# Patient Record
Sex: FEMALE | Race: WHITE | Employment: FULL TIME | URBAN - METROPOLITAN AREA
[De-identification: names, ages, dates, MRNs, and addresses within clinical notes are randomized per-mention and may not be internally consistent; named-entity substitution may affect disease eponyms.]

---

## 2018-05-07 ENCOUNTER — RESULT FOLLOW UP (OUTPATIENT)
Dept: OBGYN | Facility: CLINIC | Age: 28
End: 2018-05-07

## 2018-05-07 ENCOUNTER — OFFICE VISIT (OUTPATIENT)
Dept: OBGYN | Facility: CLINIC | Age: 28
End: 2018-05-07
Payer: COMMERCIAL

## 2018-05-07 VITALS
BODY MASS INDEX: 30.57 KG/M2 | HEART RATE: 87 BPM | DIASTOLIC BLOOD PRESSURE: 79 MMHG | OXYGEN SATURATION: 98 % | TEMPERATURE: 98.3 F | SYSTOLIC BLOOD PRESSURE: 127 MMHG | HEIGHT: 71 IN | WEIGHT: 218.4 LBS

## 2018-05-07 DIAGNOSIS — R87.612 PAPANICOLAOU SMEAR OF CERVIX WITH LOW GRADE SQUAMOUS INTRAEPITHELIAL LESION (LGSIL): ICD-10-CM

## 2018-05-07 DIAGNOSIS — B37.31 YEAST VAGINITIS: ICD-10-CM

## 2018-05-07 DIAGNOSIS — Z30.015 ENCOUNTER FOR INITIAL PRESCRIPTION OF VAGINAL RING HORMONAL CONTRACEPTIVE: ICD-10-CM

## 2018-05-07 DIAGNOSIS — Z12.4 SCREENING FOR MALIGNANT NEOPLASM OF CERVIX: ICD-10-CM

## 2018-05-07 DIAGNOSIS — N89.8 VAGINAL DISCHARGE: ICD-10-CM

## 2018-05-07 DIAGNOSIS — Z11.3 SCREEN FOR STD (SEXUALLY TRANSMITTED DISEASE): Primary | ICD-10-CM

## 2018-05-07 LAB
SPECIMEN SOURCE: ABNORMAL
WET PREP SPEC: ABNORMAL

## 2018-05-07 PROCEDURE — G0145 SCR C/V CYTO,THINLAYER,RESCR: HCPCS | Performed by: NURSE PRACTITIONER

## 2018-05-07 PROCEDURE — 87491 CHLMYD TRACH DNA AMP PROBE: CPT | Performed by: NURSE PRACTITIONER

## 2018-05-07 PROCEDURE — 86803 HEPATITIS C AB TEST: CPT | Performed by: NURSE PRACTITIONER

## 2018-05-07 PROCEDURE — 87389 HIV-1 AG W/HIV-1&-2 AB AG IA: CPT | Performed by: NURSE PRACTITIONER

## 2018-05-07 PROCEDURE — 87210 SMEAR WET MOUNT SALINE/INK: CPT | Performed by: NURSE PRACTITIONER

## 2018-05-07 PROCEDURE — 87591 N.GONORRHOEAE DNA AMP PROB: CPT | Performed by: NURSE PRACTITIONER

## 2018-05-07 PROCEDURE — 86780 TREPONEMA PALLIDUM: CPT | Performed by: NURSE PRACTITIONER

## 2018-05-07 PROCEDURE — 36415 COLL VENOUS BLD VENIPUNCTURE: CPT | Performed by: NURSE PRACTITIONER

## 2018-05-07 PROCEDURE — 99203 OFFICE O/P NEW LOW 30 MIN: CPT | Performed by: NURSE PRACTITIONER

## 2018-05-07 PROCEDURE — 87340 HEPATITIS B SURFACE AG IA: CPT | Performed by: NURSE PRACTITIONER

## 2018-05-07 PROCEDURE — G0124 SCREEN C/V THIN LAYER BY MD: HCPCS | Performed by: NURSE PRACTITIONER

## 2018-05-07 RX ORDER — ETONOGESTREL AND ETHINYL ESTRADIOL VAGINAL RING .015; .12 MG/D; MG/D
RING VAGINAL
Qty: 3 EACH | Refills: 3 | Status: SHIPPED | OUTPATIENT
Start: 2018-05-07 | End: 2018-11-23

## 2018-05-07 RX ORDER — FLUCONAZOLE 150 MG/1
150 TABLET ORAL ONCE
Qty: 1 TABLET | Refills: 0 | Status: SHIPPED | OUTPATIENT
Start: 2018-05-07 | End: 2018-05-07

## 2018-05-07 ASSESSMENT — PAIN SCALES - GENERAL: PAINLEVEL: NO PAIN (0)

## 2018-05-07 NOTE — PROGRESS NOTES
"  SUBJECTIVE:   Enrique Worthy is a 27 year old female who presents to clinic today for the following health issues:    Vaginal Symptoms  Onset: 05/06/2018    Description:  Vaginal Discharge: white curd-like   Itching (Pruritis): YES  Burning sensation:  YES  Odor: no     Accompanying Signs & Symptoms:  Pain with Urination: no   Abdominal Pain: no   Fever: no     History:   Sexually active: YES  New Partner: YES  Possibility of Pregnancy:  Yes    Precipitating factors:   Recent Antibiotic Use: no     Alleviating factors:  na    Therapies Tried and outcome: none    Patient has a new partner and would like STI screening. Discharge began yesterday, white and thick, accompanied by vulvar itching and discomfort. No abnormal odor, urinary symptoms. Denies nausea, fevers, use of new products.   Overdue for pap smear and amenable to completing today.  Would also like to discuss contraception-currently using \"withdrawal\" method.    Problem list and histories reviewed & adjusted, as indicated.  Additional history: as documented    Patient Active Problem List   Diagnosis     Posttraumatic stress disorder     Obsessive-compulsive disorder     CARDIOVASCULAR SCREENING; LDL GOAL LESS THAN 160     Past Surgical History:   Procedure Laterality Date     NO HISTORY OF SURGERY         Social History   Substance Use Topics     Smoking status: Never Smoker     Smokeless tobacco: Never Used     Alcohol use No     History reviewed. No pertinent family history.        Reviewed and updated as needed this visit by clinical staff       Reviewed and updated as needed this visit by Provider         ROS:  Constitutional, HEENT, cardiovascular, pulmonary, gi and gu systems are negative, except as otherwise noted.    OBJECTIVE:     /79  Pulse 87  Temp 98.3  F (36.8  C) (Oral)  Ht 5' 11\" (1.803 m)  Wt 218 lb 6.4 oz (99.1 kg)  LMP 04/23/2018 (Approximate)  SpO2 98%  BMI 30.46 kg/m2  Body mass index is 30.46 kg/(m^2).  GENERAL: healthy, " alert and no distress  RESP: lungs clear to auscultation - no rales, rhonchi or wheezes  CV: regular rate and rhythm, normal S1 S2, no S3 or S4, no murmur, click or rub, no peripheral edema and peripheral pulses strong  ABDOMEN: soft, nontender, no hepatosplenomegaly, no masses and bowel sounds normal   (female): normal female external genitalia, normal urethral meatus, vaginal mucosa, normal cervix/adnexa/uterus without masses or discharge  MS: no gross musculoskeletal defects noted, no edema  SKIN: no suspicious lesions or rashes  PSYCH: mentation appears normal, affect normal/bright    Diagnostic Test Results:  Results for orders placed or performed in visit on 05/07/18 (from the past 24 hour(s))   Wet prep   Result Value Ref Range    Specimen Description Vagina     Wet Prep Yeast seen (A)     Wet Prep No Trichomonas seen     Wet Prep No clue cells seen        ASSESSMENT/PLAN:   1. Screening for malignant neoplasm of cervix  - Pap imaged thin layer screen reflex to HPV if ASCUS - recommend age 25 - 29    2. Vaginal discharge  - Wet prep    3. Screen for STD (sexually transmitted disease)  Labs obtained and we will notify patient of results when available.  - Neisseria gonorrhoeae PCR  - Chlamydia trachomatis PCR  - HIV Antigen Antibody Combo  - Hepatitis B surface antigen  - Hepatitis C antibody  - Treponema Abs w Reflex to RPR and Titer    4. Yeast vaginitis  Discussed results. Will send prescription for Diflucan. Discussed use, recommend no intercourse x 1 week.  - fluconazole (DIFLUCAN) 150 MG tablet; Take 1 tablet (150 mg) by mouth once for 1 dose  Dispense: 1 tablet; Refill: 0    5. Encounter for initial prescription of vaginal ring hormonal contraceptive  Discussed options for contraception with patient including oral contraceptive pills, transdermal patches, vaginal ring, Depo Provera, Nexplanon. At this time she would like to try Nuva Ring. We discussed when to start, insertion and removal, possible  side effects she may experience, and use of barrier method to protect against STDs.   - etonogestrel-ethinyl estradiol (NUVARING) 0.12-0.015 MG/24HR vaginal ring; Insert 1 ring vaginally and leave in for 21 days then remove for 1 week then repeat with new ring.  Dispense: 3 each; Refill: 3    OPAL Germain CNP  [facility]

## 2018-05-07 NOTE — NURSING NOTE
"Chief Complaint   Patient presents with     Vaginal Problem       Initial /79  Pulse 87  Temp 98.3  F (36.8  C) (Oral)  Ht 5' 11\" (1.803 m)  Wt 218 lb 6.4 oz (99.1 kg)  LMP 04/23/2018 (Approximate)  SpO2 98%  BMI 30.46 kg/m2 Estimated body mass index is 30.46 kg/(m^2) as calculated from the following:    Height as of this encounter: 5' 11\" (1.803 m).    Weight as of this encounter: 218 lb 6.4 oz (99.1 kg)..  BP completed using cuff size: perlita Greene CMA    "

## 2018-05-07 NOTE — LETTER
July 9, 2018      Enrique LUNDBERG Deacon  [address] [place] [address]    Dear ,      At [facility], your health and wellness is our primary concern. That is why we are following up on an abnormal pap from 5/7/18, which was reported as LSIL. Your provider had recommended that you have a Colposcopy completed by 8/7/18. Our records do not show that this has been scheduled.    It is important to complete the follow up that your provider has suggested for you to ensure that there are no worsening changes which may, over time, develop into cancer.      Please contact our office at  [phone] to schedule an appointment for a Colposcopy at your earliest convenience. If you have questions or concerns, please call the clinic and we will be happy to assist you.    If you have completed the tests outside of [facility], please have the results forwarded to our office. We will update the chart for your primary Physician to review before your next annual physical.     Thank you for choosing [facility]!    Sincerely,      OPAL Germain CNP/rlm

## 2018-05-07 NOTE — MR AVS SNAPSHOT
After Visit Summary   5/7/2018    Enrique Worthy    MRN: [id number]           Patient Information     Date Of Birth          1990        Visit Information        Provider Department      5/7/2018 11:50 AM Jennie Ryan APRN CNP [facility]        Today's Diagnoses     Screen for STD (sexually transmitted disease)    -  1    Screening for malignant neoplasm of cervix        Vaginal discharge        Yeast vaginitis        Encounter for initial prescription of vaginal ring hormonal contraceptive           Follow-ups after your visit        Who to contact     If you have questions or need follow up information about today's clinic visit or your schedule please contact [facility] directly at [phone].  Normal or non-critical lab and imaging results will be communicated to you by MyChart, letter or phone within 4 business days after the clinic has received the results. If you do not hear from us within 7 days, please contact the clinic through NIM[employer] or phone. If you have a critical or abnormal lab result, we will notify you by phone as soon as possible.  Submit refill requests through "[employer] or call your pharmacy and they will forward the refill request to us. Please allow 3 business days for your refill to be completed.          Additional Information About Your Visit        MyChart Information     "[employer] gives you secure access to your electronic health record. If you see a primary care provider, you can also send messages to your care team and make appointments. If you have questions, please call your primary care clinic.  If you do not have a primary care provider, please call [phone] and they will assist you.        Care EveryWhere ID     This is your Care EveryWhere ID. This could be used by other organizations to access your [place] medical records  [id number]        Your Vitals Were     Pulse Temperature Height Last Period Pulse Oximetry BMI  "(Body Mass Index)    87 98.3  F (36.8  C) (Oral) 5' 11\" (1.803 m) 04/23/2018 (Approximate) 98% 30.46 kg/m2       Blood Pressure from Last 3 Encounters:   05/07/18 127/79   03/01/15 112/80   05/30/13 112/67    Weight from Last 3 Encounters:   05/07/18 218 lb 6.4 oz (99.1 kg)   03/01/15 180 lb (81.6 kg)   05/30/13 181 lb (82.1 kg)              We Performed the Following     Chlamydia trachomatis PCR     Hepatitis B surface antigen     Hepatitis C antibody     HIV Antigen Antibody Combo     Neisseria gonorrhoeae PCR     Pap imaged thin layer screen reflex to HPV if ASCUS - recommend age 25 - 29     Treponema Abs w Reflex to RPR and Titer     Wet prep          Today's Medication Changes          These changes are accurate as of 5/7/18 12:49 PM.  If you have any questions, ask your nurse or doctor.               Start taking these medicines.        Dose/Directions    etonogestrel-ethinyl estradiol 0.12-0.015 MG/24HR vaginal ring   Commonly known as:  NUVARING   Used for:  Encounter for initial prescription of vaginal ring hormonal contraceptive   Started by:  Jennie Ryan APRN CNP        Insert 1 ring vaginally and leave in for 21 days then remove for 1 week then repeat with new ring.   Quantity:  3 each   Refills:  3       fluconazole 150 MG tablet   Commonly known as:  DIFLUCAN   Used for:  Yeast vaginitis   Started by:  Jennie Ryan APRN CNP        Dose:  150 mg   Take 1 tablet (150 mg) by mouth once for 1 dose   Quantity:  1 tablet   Refills:  0         Stop taking these medicines if you haven't already. Please contact your care team if you have questions.     ALPRAZolam 0.5 MG tablet   Commonly known as:  XANAX   Stopped by:  Jennie Ryan APRN CNP           ibuprofen 800 MG tablet   Commonly known as:  ADVIL/MOTRIN   Stopped by:  Jennie Ryan APRN CNP           IBUPROFEN PO   Stopped by:  Jennie Ryan APRN CNP                Where to get your medicines      These " [facility] [place], [place] [facility] [address] [facility] [address]  [address] [facility], [address], [facility] [address]     Phone:  [phone]     etonogestrel-ethinyl estradiol 0.12-0.015 MG/24HR vaginal ring    fluconazole 150 MG tablet                Primary Care Provider Office Phone # Fax #    [facility] [phone] [address] [facility] [address]        Equal Access to Services     JOSAFAT REYEZ : Hadii aad ku hadasho Soomaali, waaxda luqadaha, qaybta kaalmada adeegyada, waxay idiin hayaan adeeg khsangeeta brizuela . So [facility] [phone].    ATENCIÓN: Si habla español, tiene a molina disposición servicios gratuitos de asistencia lingüística. [facility] [phone].    We comply with applicable federal civil rights laws and [place] laws. We do not discriminate on the basis of race, color, national origin, age, disability, sex, sexual orientation, or gender identity.            Thank you!     Thank you for choosing [facility]  for your care. Our goal is always to provide you with excellent care. Hearing back from our patients is one way we can continue to improve our services. Please take a few minutes to complete the written survey that you may receive in the mail after your visit with us. Thank you!             Your Updated Medication List - Protect others around you: Learn how to safely use, store and throw away your medicines at www.disposemymeds.org.          This list is accurate as of 5/7/18 12:49 PM.  Always use your most recent med list.                   Brand Name Dispense Instructions for use Diagnosis    CELEXA PO      Take 40 mg by mouth        etonogestrel-ethinyl estradiol 0.12-0.015 MG/24HR vaginal ring    NUVARING    3 each    Insert 1 ring vaginally and leave in for 21 days then remove for 1 week then repeat with new ring.    Encounter for initial prescription of vaginal ring hormonal contraceptive       fluconazole 150 MG  tablet    DIFLUCAN    1 tablet    Take 1 tablet (150 mg) by mouth once for 1 dose    Yeast vaginitis

## 2018-05-07 NOTE — LETTER
October 24, 2018      Enrique LUNDBERG Deacon  [address] [place] [address]    Dear ,      At [facility], your health and wellness is our primary concern. That is why we are following up on an abnormal pap from 5/7/18, which was reported as LSIL. Your provider had recommended that you have a Colposcopy completed by 8/7/18. Our records do not show that this has been scheduled.     It is important to complete the follow up that your provider has suggested for you to ensure that there are no worsening changes which may, over time, develop into cancer.      If you have chosen not to do the recommended colposcopy, please contact our office at [phone] to schedule an appointment for a repeat PAP smear and HPV test at your earliest convenience.    If you have completed the tests outside of [facility], please have the results forwarded to our office. We will update the chart for your primary Physician to review before your next annual physical.     Thank you for choosing [facility]!    Sincerely,      OPAL Germain CNP/rlm

## 2018-05-08 LAB
C TRACH DNA SPEC QL NAA+PROBE: NEGATIVE
HBV SURFACE AG SERPL QL IA: NONREACTIVE
HCV AB SERPL QL IA: NONREACTIVE
HIV 1+2 AB+HIV1 P24 AG SERPL QL IA: NONREACTIVE
N GONORRHOEA DNA SPEC QL NAA+PROBE: NEGATIVE
SPECIMEN SOURCE: NORMAL
SPECIMEN SOURCE: NORMAL
T PALLIDUM AB SER QL: NONREACTIVE

## 2018-05-11 LAB
COPATH REPORT: ABNORMAL
PAP: ABNORMAL

## 2018-05-14 PROBLEM — R87.612 PAPANICOLAOU SMEAR OF CERVIX WITH LOW GRADE SQUAMOUS INTRAEPITHELIAL LESION (LGSIL): Status: ACTIVE | Noted: 2018-05-07

## 2018-05-14 NOTE — PROGRESS NOTES
5/7/18 LSIL Pap @ 26 yo. Plan colp due by 8/7/18.   5/14/18 Pt notified via phone.   6/4/18 No-showed colp appt.  7/9/18 My Chart Colposcopy Reminder message sent (rlm)  7/16/18 My Chart not read, reminder letter sent (rlm)  8/28/18 [place] not done. Tracking updated for 6 mo colp/pap. FYI sent to provider.   10/24/18 [place]/Pap reminder letter sent (rlm)  11/14/18 Reminder call to pt, she does not have Health Insurance, gave her SHERRI info, she will look it up right now and try to schedule (rlm)

## 2018-05-19 ENCOUNTER — HEALTH MAINTENANCE LETTER (OUTPATIENT)
Age: 28
End: 2018-05-19

## 2018-09-01 ENCOUNTER — HEALTH MAINTENANCE LETTER (OUTPATIENT)
Age: 28
End: 2018-09-01

## 2018-11-23 ENCOUNTER — OFFICE VISIT (OUTPATIENT)
Dept: URGENT CARE | Facility: URGENT CARE | Age: 28
End: 2018-11-23
Payer: MEDICAID

## 2018-11-23 VITALS
DIASTOLIC BLOOD PRESSURE: 80 MMHG | TEMPERATURE: 99.1 F | OXYGEN SATURATION: 98 % | WEIGHT: 220.4 LBS | BODY MASS INDEX: 30.74 KG/M2 | HEART RATE: 75 BPM | SYSTOLIC BLOOD PRESSURE: 129 MMHG

## 2018-11-23 DIAGNOSIS — K06.9 GUM DISEASE: ICD-10-CM

## 2018-11-23 DIAGNOSIS — K08.89 PAIN, DENTAL: Primary | ICD-10-CM

## 2018-11-23 PROCEDURE — 99203 OFFICE O/P NEW LOW 30 MIN: CPT | Performed by: FAMILY MEDICINE

## 2018-11-23 RX ORDER — CLINDAMYCIN HCL 300 MG
300 CAPSULE ORAL 3 TIMES DAILY
Qty: 21 CAPSULE | Refills: 0 | Status: SHIPPED | OUTPATIENT
Start: 2018-11-23 | End: 2019-02-25

## 2018-11-23 NOTE — LETTER
[facility]  [address] [facility] [address]  Phone: [phone]    November 23, 2018        Enrique Worthy  [address] [place] [address]          To whom it may concern:    RE: Enrique Kernsn    Patient was seen and treated today at our clinic.  Patient has pressing medical condition that needs urgent attention.  Please excuse on 11/27/2018    Please contact me for questions or concerns.      Sincerely,        Linette Schulz MD

## 2018-11-23 NOTE — MR AVS SNAPSHOT
After Visit Summary   11/23/2018    Enrique Worthy    MRN: [id number]           Patient Information     Date Of Birth          1990        Visit Information        Provider Department      11/23/2018 8:10 PM Linette Schulz MD [facility]        Today's Diagnoses     Pain, dental    -  1    Gum disease           Follow-ups after your visit        Who to contact     If you have questions or need follow up information about today's clinic visit or your schedule please contact [facility] directly at [phone].  Normal or non-critical lab and imaging results will be communicated to you by [employer], letter or phone within 4 business days after the clinic has received the results. If you do not hear from us within 7 days, please contact the clinic through [employer] or phone. If you have a critical or abnormal lab result, we will notify you by phone as soon as possible.  Submit refill requests through "[employer]" or call your pharmacy and they will forward the refill request to us. Please allow 3 business days for your refill to be completed.          Additional Information About Your Visit        MyChart Information     "[employer]" gives you secure access to your electronic health record. If you see a primary care provider, you can also send messages to your care team and make appointments. If you have questions, please call your primary care clinic.  If you do not have a primary care provider, please call [phone] and they will assist you.        Care EveryWhere ID     This is your Care EveryWhere ID. This could be used by other organizations to access your [place] medical records  [id number]        Your Vitals Were     Pulse Temperature Pulse Oximetry BMI (Body Mass Index)          75 99.1  F (37.3  C) (Oral) 98% 30.74 kg/m2         Blood Pressure from Last 3 Encounters:   11/23/18 129/80   05/07/18 127/79   03/01/15 112/80    Weight from Last 3 Encounters:   11/23/18  220 lb 6.4 oz (100 kg)   05/07/18 218 lb 6.4 oz (99.1 kg)   03/01/15 180 lb (81.6 kg)              Today, you had the following     No orders found for display         Today's Medication Changes          These changes are accurate as of 11/23/18 10:25 PM.  If you have any questions, ask your nurse or doctor.               Start taking these medicines.        Dose/Directions    clindamycin 300 MG capsule   Commonly known as:  CLEOCIN   Used for:  Pain, dental, Gum disease        Dose:  300 mg   Take 1 capsule (300 mg) by mouth 3 times daily for 7 days   Quantity:  21 capsule   Refills:  0         Stop taking these medicines if you haven't already. Please contact your care team if you have questions.     etonogestrel-ethinyl estradiol 0.12-0.015 MG/24HR vaginal ring   Commonly known as:  NUVARING                Where to get your medicines      These medications were sent to [facility] [address] [place] [address] & [facility]  [address] [facility] [address]    Hours:  24-hours Phone:  [phone]     clindamycin 300 MG capsule                Primary Care Provider Office Phone # Fax #    [facility] [phone] [address] [facility] [address]        Equal Access to Services     JOSAFAT REYEZ AH: Hadii aad ku hadasho Soomaali, waaxda luqadaha, qaybta kaalmada adeegyada, bee angulo. So [facility] [phone].    ATENCIÓN: Si habla español, tiene a molina disposición servicios gratuitos de asistencia lingüística. Llame al [phone].    We comply with applicable federal civil rights laws and [place] laws. We do not discriminate on the basis of race, color, national origin, age, disability, sex, sexual orientation, or gender identity.            Thank you!     Thank you for choosing [facility]  for your care. Our goal is always to provide you with excellent care. Hearing back from  our patients is one way we can continue to improve our services. Please take a few minutes to complete the written survey that you may receive in the mail after your visit with us. Thank you!             Your Updated Medication List - Protect others around you: Learn how to safely use, store and throw away your medicines at www.disposemymeds.org.          This list is accurate as of 11/23/18 10:25 PM.  Always use your most recent med list.                   Brand Name Dispense Instructions for use Diagnosis    CELEXA PO      Take 40 mg by mouth        clindamycin 300 MG capsule    CLEOCIN    21 capsule    Take 1 capsule (300 mg) by mouth 3 times daily for 7 days    Pain, dental, Gum disease       etonogestrel 68 MG Impl    IMPLANON/NEXPLANON     1 each by Subdermal route once

## 2018-11-24 NOTE — PROGRESS NOTES
Chief complaint: dental pain    Has a phobia of the dentist  Last year patient had a tooth pulled and patient has broken off teeth    2 weeks ago started having pain in the left lower molar  Started noticing infection and pain    Patient went to the ER was prescribed with clindamycin and tylenol 3.    Sister is having brain surgery next week for benign causes  Patient cannot find her clindamycin     Has been off clindamycin 5 days and now pain is getting worse and more swollen     New patient   No known medical problems  No thoughts of harming self or others   Feels safe at home     Denies any history of ulcers or kidney disease or any known contraindication to NSAIDs  Patient able to eat and drink and swallow.   Has tried OTC medications     Problem list, Medication list, Allergies, and Medical/Social/Surgical histories reviewed in EPIC and updated as appropriate.      ROS:  Constitutional: NO fevers  ENT: as above  No fevers or chills chest pain or shortness of breath      OBJECTIVE:   Blood pressure 129/80, pulse 75, temperature 99.1  F (37.3  C), temperature source Oral, weight 220 lb 6.4 oz (100 kg), SpO2 98 %, not currently breastfeeding.   Awake alert not in any acute cardiorespiratory distress  Psych: pleasant . No thoughts of harming self or others   Neurologic: No gross neurologic deficits  Skin: no obvious lesions or rashes  Eye exam : conjunctiva clear.  ENT: normal. No TMJ tenderness. No thyroid enlargement  Mouth:  Airway intact.  Dental carries multiple  Left upper molar and lower molar fractured teeth with corresponding gum swelling    no trismus.   NECK:  The neck is supple. No neck or facial swelling. No extension beyond the jaw    ASSESSMENT:      ICD-10-CM    1. Pain, dental K08.89 clindamycin (CLEOCIN) 300 MG capsule   2. Gum disease K06.9 clindamycin (CLEOCIN) 300 MG capsule           PLAN:  She is requesting additional clindamycin as she states she has lost her bottle of antibiotic    Prescribed with clindamycin. Aware of risk of c.diff with clindamycin. Aware to stop antibiotic immediately and come in to be seen if develop any diarrheal reaction. Alternative therapies were also offered and discussed  Follow up with dentist - she states she has made an appointment but won't be seen until January.  I informed her that this is not acceptable and that she should be seen within a few days - she states she can be seen as walk in but would have to be there the whole day and worked in the schedule so I gave her a work note to excuse her from work so she can do this  She then asks for something stronger for pain. She states she still has tylenol 3 but this is not helping.  Patient has been dealing with the pain for 2 weeks. I offered either diclofenac sodium or 4 tablets of Vicodin (one for each evening until she is seen by dentist) however she refused either prescription  Alarm signs or symptoms discussed, if present recommend go to ER   Adverse reactions to medications discussed  Aware to come back in if with worsening symptoms or concerns or no relief despite treatment plan  Please go to ER or come in to be seen immediately especially if with any difficulty swallowing or opening your mouth or worsening swelling.   Patient voiced understanding    Patient did have some neck fullness but on palpation thyroid gland appears normal in size  With her history of anxiety and depression, recommend tsh screening if not already done by her primary care provider. She denies getting this checked today and plans to follow up with her primary care provider to discuss this.      Linette Schulz MD

## 2019-02-25 ENCOUNTER — OFFICE VISIT (OUTPATIENT)
Dept: FAMILY MEDICINE | Facility: CLINIC | Age: 29
End: 2019-02-25
Payer: COMMERCIAL

## 2019-02-25 ENCOUNTER — TELEPHONE (OUTPATIENT)
Dept: FAMILY MEDICINE | Facility: CLINIC | Age: 29
End: 2019-02-25

## 2019-02-25 VITALS
TEMPERATURE: 98.5 F | WEIGHT: 224.2 LBS | RESPIRATION RATE: 16 BRPM | HEART RATE: 70 BPM | HEIGHT: 71 IN | SYSTOLIC BLOOD PRESSURE: 115 MMHG | DIASTOLIC BLOOD PRESSURE: 78 MMHG | BODY MASS INDEX: 31.39 KG/M2 | OXYGEN SATURATION: 100 %

## 2019-02-25 DIAGNOSIS — Z30.015 ENCOUNTER FOR INITIAL PRESCRIPTION OF VAGINAL RING HORMONAL CONTRACEPTIVE: ICD-10-CM

## 2019-02-25 DIAGNOSIS — K08.89 PAIN, DENTAL: Primary | ICD-10-CM

## 2019-02-25 DIAGNOSIS — K06.9 GUM DISEASE: ICD-10-CM

## 2019-02-25 DIAGNOSIS — R87.612 LOW GRADE SQUAMOUS INTRAEPITHELIAL LESION ON CYTOLOGIC SMEAR OF CERVIX (LGSIL): ICD-10-CM

## 2019-02-25 PROCEDURE — 99214 OFFICE O/P EST MOD 30 MIN: CPT | Performed by: PHYSICIAN ASSISTANT

## 2019-02-25 RX ORDER — ETONOGESTREL AND ETHINYL ESTRADIOL VAGINAL RING .015; .12 MG/D; MG/D
1 RING VAGINAL
Qty: 3 EACH | Refills: 0 | Status: SHIPPED | OUTPATIENT
Start: 2019-02-25 | End: 2019-07-03

## 2019-02-25 RX ORDER — CLINDAMYCIN HCL 300 MG
300 CAPSULE ORAL 3 TIMES DAILY
Qty: 21 CAPSULE | Refills: 0 | Status: SHIPPED | OUTPATIENT
Start: 2019-02-25

## 2019-02-25 RX ORDER — IBUPROFEN 800 MG/1
800 TABLET, FILM COATED ORAL EVERY 8 HOURS PRN
Qty: 45 TABLET | Refills: 0 | Status: SHIPPED | OUTPATIENT
Start: 2019-02-25 | End: 2020-02-25

## 2019-02-25 ASSESSMENT — PAIN SCALES - GENERAL: PAINLEVEL: SEVERE PAIN (7)

## 2019-02-25 ASSESSMENT — MIFFLIN-ST. JEOR: SCORE: 1843.09

## 2019-02-26 NOTE — NURSING NOTE
"Initial /78   Pulse 70   Temp 98.5  F (36.9  C) (Tympanic)   Resp 16   Ht 1.803 m (5' 11\")   Wt 101.7 kg (224 lb 3.2 oz)   LMP  (LMP Unknown)   SpO2 100%   BMI 31.27 kg/m   Estimated body mass index is 31.27 kg/m  as calculated from the following:    Height as of this encounter: 1.803 m (5' 11\").    Weight as of this encounter: 101.7 kg (224 lb 3.2 oz). .    Maura Antonio, MORAIMA on 2/25/2019 at 6:18 PM    "

## 2019-02-26 NOTE — PROGRESS NOTES
SUBJECTIVE:   Enrique Worthy is a 28 year old female who presents to clinic today for the following health issues:      Mouth problem      Duration: 4 days    Description (location/character/radiation): broken teeth that are infected.    Intensity:  7/10    Accompanying signs and symptoms: Some swelling, painful, some bleeding.     History (similar episodes/previous evaluation): yes.    Precipitating or alleviating factors: Chewing, or drinking makes it worse.    Therapies tried and outcome: Tylenol, ibuprofen, and Orajel with no real relief.      She was seen by a dentist in January and told that she needs the broken teeth pulled.  She has not had those teeth pulled yet as she gets extremely anxious about going to the dentist.  No fevers, no drainage noted.     She recently had the nexplanon removed and would like to restart the nuva ring (which she has tolerated well in the past).      She had a pap smear in May 2018 and it showed LSIL.  She was out of insurance but now has it and would like to schedule the f/u colposcopy as advised.         Problem list and histories reviewed & adjusted, as indicated.  Additional history: as documented    Patient Active Problem List   Diagnosis     Posttraumatic stress disorder     Obsessive-compulsive disorder     CARDIOVASCULAR SCREENING; LDL GOAL LESS THAN 160     Papanicolaou smear of cervix with low grade squamous intraepithelial lesion (LGSIL)     Past Surgical History:   Procedure Laterality Date     NO HISTORY OF SURGERY         Social History     Tobacco Use     Smoking status: Never Smoker     Smokeless tobacco: Never Used   Substance Use Topics     Alcohol use: No     History reviewed. No pertinent family history.      Current Outpatient Medications   Medication Sig Dispense Refill     Citalopram Hydrobromide (CELEXA PO) Take 40 mg by mouth       clindamycin (CLEOCIN) 300 MG capsule Take 1 capsule (300 mg) by mouth 3 times daily 21 capsule 0     etonogestrel-ethinyl  "estradiol (NUVARING) 0.12-0.015 MG/24HR vaginal ring Place 1 each vaginally every 28 days 3 each 0     ibuprofen (ADVIL/MOTRIN) 800 MG tablet Take 1 tablet (800 mg) by mouth every 8 hours as needed for moderate pain (with food) 45 tablet 0     etonogestrel (IMPLANON/NEXPLANON) 68 MG IMPL 1 each by Subdermal route once       BP Readings from Last 3 Encounters:   02/25/19 115/78   11/23/18 129/80   05/07/18 127/79    Wt Readings from Last 3 Encounters:   02/25/19 101.7 kg (224 lb 3.2 oz)   11/23/18 100 kg (220 lb 6.4 oz)   05/07/18 99.1 kg (218 lb 6.4 oz)                    Reviewed and updated as needed this visit by clinical staff  Tobacco  Allergies  Meds  Med Hx  Surg Hx  Fam Hx  Soc Hx      Reviewed and updated as needed this visit by Provider         ROS:  Constitutional, HEENT, cardiovascular, pulmonary, gi and gu systems are negative, except as otherwise noted.    OBJECTIVE:     /78   Pulse 70   Temp 98.5  F (36.9  C) (Tympanic)   Resp 16   Ht 1.803 m (5' 11\")   Wt 101.7 kg (224 lb 3.2 oz)   LMP  (LMP Unknown)   SpO2 100%   BMI 31.27 kg/m    Body mass index is 31.27 kg/m .  GENERAL: healthy, alert and no distress  HENT: mouth without ulcers or lesions, 3 teeth are broken down to gum line and mild swelling noted around gums.    Psych:  Anxious and tearful at times.      Diagnostic Test Results:  none     ASSESSMENT/PLAN:     1. Pain, dental  Will restart cleocin (which she has tolerated well in the past), however I discussed with her that this is only a short term fix and that definitive treatment is by removing broken teeth with her dentist.  She plans to see the dentist in 2 days and will have them removed at that time.  She states the pain helps drive her desire to have the procedure done.  She may use ibuprofen PRN   - clindamycin (CLEOCIN) 300 MG capsule; Take 1 capsule (300 mg) by mouth 3 times daily  Dispense: 21 capsule; Refill: 0  - ibuprofen (ADVIL/MOTRIN) 800 MG tablet; Take 1 " tablet (800 mg) by mouth every 8 hours as needed for moderate pain (with food)  Dispense: 45 tablet; Refill: 0    2. Gum disease  As above.   - clindamycin (CLEOCIN) 300 MG capsule; Take 1 capsule (300 mg) by mouth 3 times daily  Dispense: 21 capsule; Refill: 0    3. Low grade squamous intraepithelial lesion on cytologic smear of cervix (LGSIL)  Reviewed last Pap with Enrique and she is due to a colposcopy, referral placed.   - OB/GYN REFERRAL    4. Encounter for initial prescription of vaginal ring hormonal contraceptive  Refilled, f/u with PCP for continued refills  - etonogestrel-ethinyl estradiol (NUVARING) 0.12-0.015 MG/24HR vaginal ring; Place 1 each vaginally every 28 days  Dispense: 3 each; Refill: 0    CONSULTATION/REFERRAL to dentist and Gyne    Daya Smiley PA-C  [facility]

## 2019-02-26 NOTE — TELEPHONE ENCOUNTER
Per Daya Smiley PA-C patient needs a colposcopy. Please call patient and assist her in scheduling this.    Maura Antonio CMA([facility])

## 2019-05-21 NOTE — LETTER
July 16, 2018      Enrique LUNDBERG Deacon  [address] [place] [id number]    Dear ,      At [facility], your health and wellness is our primary concern. That is why we are following up on an abnormal pap from 5/7/18, which was reported as LSIL. Your provider had recommended that you have a Colposcopy completed by 8/7/18. Our records do not show that this has been scheduled.    It is important to complete the follow up that your provider has suggested for you to ensure that there are no worsening changes which may, over time, develop into cancer.      Please contact our office at  [phone] to schedule an appointment for a Colposcopy at your earliest convenience. If you have questions or concerns, please call the clinic and we will be happy to assist you.    If you have completed the tests outside of [facility], please have the results forwarded to our office. We will update the chart for your primary Physician to review before your next annual physical.     Thank you for choosing [facility]!    Sincerely,      OPAL Germain CNP/rlm     Informed consent was obtained and is in the chart. The patient was placed in the supine position. Using ultrasound at the bedside the neck was surveyed for aberrant vasculature (there was none ) and to evaluate the anatomy of the neck. The anterior neck was prepped and draped in usual sterile fashion. 1% lidocaine was administered approximately 2 fingerbreadths above the sternal notch for local anesthesia. A 1.5-cm vertical incision was then performed 2 fingerbreadths above the sternal notch. Using a curved Kinjal, blunt dissection was performed down to the level of the pretracheal fascia. At this point, the bronchoscope was introduced through the endotracheal tube and the trachea was properly visualized. The endotracheal tube was then gradually withdrawn within the trachea under direct bronchoscopic visualization. Proper midline position was confirmed by bouncing the needle from the tracheostomy tray over the trachea with bronchoscopic examination. The needle was advanced into the trachea and proper positioning was confirmed with direct visualization. The wire from the tracheostomy tray was then advanced through the needle. The needle was then removed. The small, blue dilator was then advanced over the wire into the trachea. Once proper dilatation was achieved, the dilator was removed. The large, tapered dilator was then advanced over the wire into the trachea. The dilator was removed leaving the wire and white inner cannula in position. A number 6 percutaneous Shiley tracheostomy tube was then advanced over the wire and white inner cannula into the trachea. Proper positioning was confirmed with bronchoscopic visualization. The tracheostomy tube was then sutured in place with two nylon sutures. It was further secured with a tracheostomy tie.

## 2019-07-03 DIAGNOSIS — Z30.015 ENCOUNTER FOR INITIAL PRESCRIPTION OF VAGINAL RING HORMONAL CONTRACEPTIVE: ICD-10-CM

## 2019-07-05 RX ORDER — ETONOGESTREL/ETHINYL ESTRADIOL .12-.015MG
RING, VAGINAL VAGINAL
Qty: 3 EACH | Refills: 0 | Status: SHIPPED | OUTPATIENT
Start: 2019-07-05

## 2019-07-05 NOTE — TELEPHONE ENCOUNTER
"Requested Prescriptions   Pending Prescriptions Disp Refills     NUVARING 0.12-0.015 MG/24HR vaginal ring [Pharmacy Med Name: NUVARING]  0     Sig: INSERT 1 RING VAGINALLY EVERY 28 DAYS  Last Written Prescription Date:  02/25/2019 #3 x 0  Last filled 02/25/2019  Last office visit: 2/25/2019 [facility] Smiley   Future Office Visit:  None         Contraceptives Protocol Passed - 7/3/2019  6:56 PM        Passed - Patient is not a current smoker if age is 35 or older        Passed - Recent (12 mo) or future (30 days) visit within the authorizing provider's specialty     Patient had office visit in the last 12 months or has a visit in the next 30 days with authorizing provider or within the authorizing provider's specialty.  See \"Patient Info\" tab in inbasket, or \"Choose Columns\" in Meds & Orders section of the refill encounter.              Passed - Medication is active on med list        Passed - No active pregnancy on record        Passed - No positive pregnancy test in past 12 months          "

## 2019-10-23 DIAGNOSIS — Z30.015 ENCOUNTER FOR INITIAL PRESCRIPTION OF VAGINAL RING HORMONAL CONTRACEPTIVE: ICD-10-CM

## 2019-10-23 NOTE — TELEPHONE ENCOUNTER
"Requested Prescriptions   Pending Prescriptions Disp Refills     NUVARING 0.12-0.015 MG/24HR vaginal ring [Pharmacy Med Name: NUVARING]  Last Written Prescription Date:  7/5/19  Last Fill Quantity: 3,  # refills: 0   Last office visit: 2/25/2019 with prescribing provider:  eneida   Future Office Visit:      0     Sig: INSERT 1 RING VAGINALLY EVERY 28 DAYS       Contraceptives Protocol Passed - 10/23/2019  6:28 AM        Passed - Patient is not a current smoker if age is 35 or older        Passed - Recent (12 mo) or future (30 days) visit within the authorizing provider's specialty     Patient has had an office visit with the authorizing provider or a provider within the authorizing providers department within the previous 12 mos or has a future within next 30 days. See \"Patient Info\" tab in inbasket, or \"Choose Columns\" in Meds & Orders section of the refill encounter.              Passed - Medication is active on med list        Passed - No active pregnancy on record        Passed - No positive pregnancy test in past 12 months          "

## 2019-10-24 NOTE — TELEPHONE ENCOUNTER
She no showed her obgyn appt. Needs colp. Also, who is her pcp going to be? Left message on answering machine for patient to call back. Ida Padilla RN

## 2019-10-29 RX ORDER — ETONOGESTREL/ETHINYL ESTRADIOL .12-.015MG
RING, VAGINAL VAGINAL
Refills: 0 | OUTPATIENT
Start: 2019-10-29

## 2019-12-07 ENCOUNTER — OFFICE VISIT (OUTPATIENT)
Dept: URGENT CARE | Facility: URGENT CARE | Age: 29
End: 2019-12-07
Payer: COMMERCIAL

## 2019-12-07 VITALS
SYSTOLIC BLOOD PRESSURE: 138 MMHG | OXYGEN SATURATION: 98 % | WEIGHT: 220 LBS | DIASTOLIC BLOOD PRESSURE: 81 MMHG | TEMPERATURE: 98.4 F | BODY MASS INDEX: 30.68 KG/M2 | HEART RATE: 76 BPM

## 2019-12-07 DIAGNOSIS — N39.0 URINARY TRACT INFECTION WITHOUT HEMATURIA, SITE UNSPECIFIED: Primary | ICD-10-CM

## 2019-12-07 LAB
ALBUMIN UR-MCNC: NEGATIVE MG/DL
APPEARANCE UR: ABNORMAL
BACTERIA #/AREA URNS HPF: ABNORMAL /HPF
BILIRUB UR QL STRIP: NEGATIVE
COLOR UR AUTO: YELLOW
GLUCOSE UR STRIP-MCNC: NEGATIVE MG/DL
HGB UR QL STRIP: ABNORMAL
KETONES UR STRIP-MCNC: NEGATIVE MG/DL
LEUKOCYTE ESTERASE UR QL STRIP: ABNORMAL
MUCOUS THREADS #/AREA URNS LPF: PRESENT /LPF
NITRATE UR QL: NEGATIVE
NON-SQ EPI CELLS #/AREA URNS LPF: ABNORMAL /LPF
PH UR STRIP: 6.5 PH (ref 5–7)
RBC #/AREA URNS AUTO: ABNORMAL /HPF
SOURCE: ABNORMAL
SP GR UR STRIP: 1.02 (ref 1–1.03)
UROBILINOGEN UR STRIP-ACNC: 0.2 EU/DL (ref 0.2–1)
WBC #/AREA URNS AUTO: ABNORMAL /HPF

## 2019-12-07 PROCEDURE — 99213 OFFICE O/P EST LOW 20 MIN: CPT | Performed by: PREVENTIVE MEDICINE

## 2019-12-07 PROCEDURE — 81001 URINALYSIS AUTO W/SCOPE: CPT | Performed by: PREVENTIVE MEDICINE

## 2019-12-07 PROCEDURE — 87086 URINE CULTURE/COLONY COUNT: CPT | Performed by: PREVENTIVE MEDICINE

## 2019-12-07 RX ORDER — NITROFURANTOIN 25; 75 MG/1; MG/1
100 CAPSULE ORAL 2 TIMES DAILY
Qty: 10 CAPSULE | Refills: 0 | Status: SHIPPED | OUTPATIENT
Start: 2019-12-07 | End: 2019-12-12

## 2019-12-07 NOTE — PATIENT INSTRUCTIONS
Lower, uncomplicated urinary tract infection.  Macrobid for 5 days  U cx pending  Follow up if not improving    PLAN:  As per ordered above  Drink plenty of fluids.  Prevention and treatment of UTI's discussed.Signs and symptoms of pyelonephritis mentioned.  Follow up with primary care physician if not improving

## 2019-12-07 NOTE — PROGRESS NOTES
SUBJECTIVE:   Enrique Worthy is a 29 year old female who  presents today for a possible UTI. Symptoms of dysuria and frequency have been going on for 1day(s).  Hematuria no.  sudden onsetand moderate.  There is no history of fever, chills, nausea or vomiting.  No history of vaginal or penile discharge. This patient does have a history of urinary tract infections. Patient denies long duration, rigors, flank pain, temperature > 101 degrees F., Vomiting, significant nausea or diarrhea, taking Coumadin and GFR less than 30 within the last year or vaginal discharge, vaginal odor and vaginal itching     Past Medical History:   Diagnosis Date     Abnormal Pap smear of cervix 05/07/2018    See problem List     Current Outpatient Medications   Medication Sig Dispense Refill     Citalopram Hydrobromide (CELEXA PO) Take 40 mg by mouth       nitroFURantoin macrocrystal-monohydrate (MACROBID) 100 MG capsule Take 1 capsule (100 mg) by mouth 2 times daily for 5 days 10 capsule 0     clindamycin (CLEOCIN) 300 MG capsule Take 1 capsule (300 mg) by mouth 3 times daily (Patient not taking: Reported on 12/7/2019) 21 capsule 0     etonogestrel (IMPLANON/NEXPLANON) 68 MG IMPL 1 each by Subdermal route once       ibuprofen (ADVIL/MOTRIN) 800 MG tablet Take 1 tablet (800 mg) by mouth every 8 hours as needed for moderate pain (with food) 45 tablet 0     NUVARING 0.12-0.015 MG/24HR vaginal ring INSERT 1 RING VAGINALLY EVERY 28 DAYS (Patient not taking: Reported on 12/7/2019) 3 each 0     Social History     Tobacco Use     Smoking status: Never Smoker     Smokeless tobacco: Never Used   Substance Use Topics     Alcohol use: No       ROS:   CONSTITUTIONAL:NEGATIVE for fever, chills, change in weight  INTEGUMENTARY/SKIN: NEGATIVE for worrisome rashes, moles or lesions  EYES: NEGATIVE for vision changes or irritation  ENT/MOUTH: NEGATIVE for ear, mouth and throat problems  RESP:NEGATIVE for significant cough or SOB  CV: NEGATIVE for chest pain,  palpitations or peripheral edema  GI: NEGATIVE for nausea, abdominal pain, heartburn, or change in bowel habits  MUSCULOSKELETAL: NEGATIVE for significant arthralgias or myalgia  NEURO: NEGATIVE for weakness, dizziness or paresthesias  ENDOCRINE: NEGATIVE for temperature intolerance, skin/hair changes    OBJECTIVE:  /81   Pulse 76   Temp 98.4  F (36.9  C) (Oral)   Wt 99.8 kg (220 lb)   SpO2 98%   Breastfeeding No   BMI 30.68 kg/m    GENERAL APPEARANCE: healthy, alert and no distress  RESP: lungs clear to auscultation - no rales, rhonchi or wheezes  CV: regular rates and rhythm, normal S1 S2, no murmur noted  ABDOMEN:  soft, nontender, no HSM or masses and bowel sounds normal  BACK: No CVA tenderness  SKIN: no suspicious lesions or rashes    UA -  WBC    ASSESSMENT:   Lower, uncomplicated urinary tract infection.  Macrobid for 5 days  U cx pending  Follow up if not improving    PLAN:  As per ordered above  Drink plenty of fluids.  Prevention and treatment of UTI's discussed.Signs and symptoms of pyelonephritis mentioned.  Follow up with primary care physician if not improving

## 2019-12-08 ENCOUNTER — HEALTH MAINTENANCE LETTER (OUTPATIENT)
Age: 29
End: 2019-12-08

## 2019-12-08 LAB
BACTERIA SPEC CULT: NORMAL
SPECIMEN SOURCE: NORMAL

## 2020-01-21 ENCOUNTER — HOSPITAL ENCOUNTER (EMERGENCY)
Facility: CLINIC | Age: 30
End: 2020-01-21
Payer: COMMERCIAL

## 2020-03-15 ENCOUNTER — HEALTH MAINTENANCE LETTER (OUTPATIENT)
Age: 30
End: 2020-03-15

## 2020-09-26 ENCOUNTER — OFFICE VISIT (OUTPATIENT)
Dept: URGENT CARE | Facility: URGENT CARE | Age: 30
End: 2020-09-26
Payer: COMMERCIAL

## 2020-09-26 VITALS
DIASTOLIC BLOOD PRESSURE: 62 MMHG | BODY MASS INDEX: 35.58 KG/M2 | HEART RATE: 94 BPM | OXYGEN SATURATION: 98 % | HEIGHT: 68 IN | SYSTOLIC BLOOD PRESSURE: 118 MMHG | TEMPERATURE: 98.7 F | WEIGHT: 234.8 LBS | RESPIRATION RATE: 16 BRPM

## 2020-09-26 DIAGNOSIS — R05.9 COUGH: Primary | ICD-10-CM

## 2020-09-26 PROCEDURE — 99214 OFFICE O/P EST MOD 30 MIN: CPT | Performed by: INTERNAL MEDICINE

## 2020-09-26 PROCEDURE — U0003 INFECTIOUS AGENT DETECTION BY NUCLEIC ACID (DNA OR RNA); SEVERE ACUTE RESPIRATORY SYNDROME CORONAVIRUS 2 (SARS-COV-2) (CORONAVIRUS DISEASE [COVID-19]), AMPLIFIED PROBE TECHNIQUE, MAKING USE OF HIGH THROUGHPUT TECHNOLOGIES AS DESCRIBED BY CMS-2020-01-R: HCPCS | Performed by: INTERNAL MEDICINE

## 2020-09-26 RX ORDER — ALBUTEROL SULFATE 90 UG/1
2 AEROSOL, METERED RESPIRATORY (INHALATION) EVERY 4 HOURS PRN
Qty: 1 INHALER | Refills: 0 | Status: SHIPPED | OUTPATIENT
Start: 2020-09-26

## 2020-09-26 RX ORDER — BENZONATATE 100 MG/1
100 CAPSULE ORAL 3 TIMES DAILY PRN
Qty: 30 CAPSULE | Refills: 0 | Status: SHIPPED | OUTPATIENT
Start: 2020-09-26

## 2020-09-26 ASSESSMENT — MIFFLIN-ST. JEOR: SCORE: 1833.55

## 2020-09-26 NOTE — PROGRESS NOTES
SUBJECTIVE:  Enrique Worthy is an 30 year old female who presents for not feeling well.  Yesterday morning woke up with sore throat.  Throat improved through the day.  Developed a cough, which started out dry, then became more wet or congested.  Cough increased through the day and developed some nasal congestion.  Cough is sometimes productive and forceful.  No fevers, chills, sweats.  Ears full like there is pressure in ears.  Feels shortness of breath but does smoke a lot and has some chronic shortness of breath.  No headaches or body aches.  No v/d.  Has chronic nausea and is no different.  No skin rashes.  No known exposures.  No recent travel.      PMH:   has a past medical history of Abnormal Pap smear of cervix (05/07/2018).   anxiety  Patient Active Problem List   Diagnosis     Posttraumatic stress disorder     Obsessive-compulsive disorder     CARDIOVASCULAR SCREENING; LDL GOAL LESS THAN 160     Papanicolaou smear of cervix with low grade squamous intraepithelial lesion (LGSIL)     Social History     Socioeconomic History     Marital status: Single     Spouse name: None     Number of children: None     Years of education: None     Highest education level: None   Occupational History     None   Social Needs     Financial resource strain: None     Food insecurity     Worry: None     Inability: None     Transportation needs     Medical: None     Non-medical: None   Tobacco Use     Smoking status: Never Smoker     Smokeless tobacco: Never Used   Substance and Sexual Activity     Alcohol use: No     Drug use: No     Sexual activity: Yes     Partners: Male     Birth control/protection: Pull-out method   Lifestyle     Physical activity     Days per week: None     Minutes per session: None     Stress: None   Relationships     Social connections     Talks on phone: None     Gets together: None     Attends [employer] service: None     Active member of club or organization: None     Attends meetings of clubs or  "organizations: None     Relationship status: None     Intimate partner violence     Fear of current or ex partner: None     Emotionally abused: None     Physically abused: None     Forced sexual activity: None   Other Topics Concern     Parent/sibling w/ CABG, MI or angioplasty before 65F 55M? Not Asked   Social History Narrative     None     History reviewed. No pertinent family history.    ALLERGIES:  Amoxicillin and Tramadol    Current Outpatient Medications   Medication     albuterol (PROAIR HFA/PROVENTIL HFA/VENTOLIN HFA) 108 (90 Base) MCG/ACT inhaler     benzonatate (TESSALON) 100 MG capsule     Citalopram Hydrobromide (CELEXA PO)     clindamycin (CLEOCIN) 300 MG capsule     etonogestrel (IMPLANON/NEXPLANON) 68 MG IMPL     NUVARING 0.12-0.015 MG/24HR vaginal ring     No current facility-administered medications for this visit.          ROS:  ROS is done and is negative for general/constitutional, eye, ENT, Respiratory, cardiovascular, GI, , Skin, musculoskeletal except as noted elsewhere.  All other review of systems negative except as noted elsewhere.      OBJECTIVE:  /62   Pulse 94   Temp 98.7  F (37.1  C) (Oral)   Resp 16   Ht 1.727 m (5' 8\")   Wt 106.5 kg (234 lb 12.8 oz)   LMP 09/15/2020 (Exact Date)   SpO2 98%   Breastfeeding No   BMI 35.70 kg/m    GENERAL APPEARANCE: Alert, in no acute distress  EYES: normal  EARS: External ears normal. Canals clear. TMs with small clear effusions, no erythema  NOSE:normal  OROPHARYNX:normal  NECK:No adenopathy,masses or thyromegaly  RESP: normal and clear to auscultation.  Periodic coarse cough  CV:regular rate and rhythm and no murmurs, clicks, or gallops  ABDOMEN: Abdomen soft, non-tender. BS normal. No masses, organomegaly  SKIN: no ulcers, lesions or rash  MUSCULOSKELETAL:Musculoskeletal normal      RESULTS  No results found for any visits on 09/26/20..  No results found for this or any previous visit (from the past 48 " hour(s)).    ASSESSMENT/PLAN:    ASSESSMENT / PLAN:  (R05) Cough  (primary encounter diagnosis)  Comment: currently pt's sxs most c/w viral etiology.  Possible covid and as can become a serious illness with test for covid.  Reviewed isolation protocols.  Also rx albuterol and tessalon for cough.  Currently no evidence of wheezing or pneumonia on exam  Plan: albuterol (PROAIR HFA/PROVENTIL HFA/VENTOLIN         HFA) 108 (90 Base) MCG/ACT inhaler, benzonatate        (TESSALON) 100 MG capsule, Symptomatic COVID-19        Virus (Coronavirus) by PCR        Reviewed medication instructions and side effects. Follow up if experiences side effects.. I reviewed supportive care, otc meds to use if needed, expected course, and signs of concern.  Follow up as needed or if she does not improve within 1 week(s) or if worsens in any way.  Reviewed red flag symptoms and is to go to the ER if experiences any of these.      PPE worn: mask, shield, gloves and blue plastic gown.    See [facility] for orders, medications, letters, patient instructions    Lisbet Keenan M.D.

## 2020-09-27 LAB
SARS-COV-2 RNA SPEC QL NAA+PROBE: NOT DETECTED
SPECIMEN SOURCE: NORMAL

## 2021-01-10 ENCOUNTER — HEALTH MAINTENANCE LETTER (OUTPATIENT)
Age: 31
End: 2021-01-10

## 2021-06-02 ENCOUNTER — RECORDS - HEALTHEAST (OUTPATIENT)
Dept: ADMINISTRATIVE | Facility: CLINIC | Age: 31
End: 2021-06-02

## 2021-10-23 ENCOUNTER — HEALTH MAINTENANCE LETTER (OUTPATIENT)
Age: 31
End: 2021-10-23

## 2022-02-12 ENCOUNTER — HEALTH MAINTENANCE LETTER (OUTPATIENT)
Age: 32
End: 2022-02-12

## 2022-10-10 ENCOUNTER — HEALTH MAINTENANCE LETTER (OUTPATIENT)
Age: 32
End: 2022-10-10

## 2023-02-18 ENCOUNTER — HEALTH MAINTENANCE LETTER (OUTPATIENT)
Age: 33
End: 2023-02-18

## 2024-03-16 ENCOUNTER — HEALTH MAINTENANCE LETTER (OUTPATIENT)
Age: 34
End: 2024-03-16

## 2025-05-24 ENCOUNTER — HEALTH MAINTENANCE LETTER (OUTPATIENT)
Age: 35
End: 2025-05-24

## 2025-06-05 ENCOUNTER — OFFICE VISIT (OUTPATIENT)
Dept: URGENT CARE | Facility: URGENT CARE | Age: 35
End: 2025-06-05
Payer: COMMERCIAL

## 2025-06-05 VITALS
RESPIRATION RATE: 18 BRPM | WEIGHT: 260 LBS | TEMPERATURE: 98.4 F | SYSTOLIC BLOOD PRESSURE: 116 MMHG | HEART RATE: 78 BPM | BODY MASS INDEX: 36.4 KG/M2 | DIASTOLIC BLOOD PRESSURE: 74 MMHG | OXYGEN SATURATION: 98 % | HEIGHT: 71 IN

## 2025-06-05 DIAGNOSIS — R30.0 DYSURIA: Primary | ICD-10-CM

## 2025-06-05 LAB
ALBUMIN UR-MCNC: ABNORMAL MG/DL
APPEARANCE UR: ABNORMAL
BACTERIA #/AREA URNS HPF: ABNORMAL /HPF
BILIRUB UR QL STRIP: NEGATIVE
CLUE CELLS: ABNORMAL
COLOR UR AUTO: YELLOW
GLUCOSE UR STRIP-MCNC: NEGATIVE MG/DL
HGB UR QL STRIP: ABNORMAL
KETONES UR STRIP-MCNC: NEGATIVE MG/DL
LEUKOCYTE ESTERASE UR QL STRIP: ABNORMAL
NITRATE UR QL: NEGATIVE
PH UR STRIP: 6 [PH] (ref 5–7)
RBC #/AREA URNS AUTO: ABNORMAL /HPF
SP GR UR STRIP: >=1.03 (ref 1–1.03)
SQUAMOUS #/AREA URNS AUTO: ABNORMAL /LPF
TRICHOMONAS, WET PREP: ABNORMAL
UROBILINOGEN UR STRIP-ACNC: 0.2 E.U./DL
WBC #/AREA URNS AUTO: ABNORMAL /HPF
WBC'S/HIGH POWER FIELD, WET PREP: ABNORMAL
YEAST, WET PREP: ABNORMAL

## 2025-06-05 NOTE — PROGRESS NOTES
Urgent Care Clinic Visit    Chief Complaint   Patient presents with    UTI     Pt states that for the last week she has had urgency and cramping in the vagina. Denies any burning or itching.               6/5/2025     5:30 PM   Additional Questions   Roomed by Raphael   Accompanied by Self

## 2025-06-05 NOTE — PROGRESS NOTES
Assessment & Plan     Dysuria    - UA Macroscopic with reflex to Microscopic and Culture - Lab Collect  - UA Macroscopic with reflex to Microscopic and Culture - Lab Collect  - UA Microscopic with Reflex to Culture  - Urine Culture  - Wet prep - Clinic Collect  - Urine Culture  - Wet prep - Clinic Collect     Reviewed UA showing no obvious UTI, will culture and notify if abx indicated. Increase water intake, decrease caffeine which can irritate bladder. Abdomen non-tender on exam currently and not acute.   Reviewed wet prep showing no BV, trich, yeast infection.   Go to emergency room if you develop worsening abdominal pain, fever returns    Follow-up with PCP if symptoms persist for 5 days, and sooner if symptoms worsen or new symptoms develop.     Discussed red flag symptoms which warrant immediate visit in emergency room    All questions were answered and patient verbalized understanding. AVS sent via [employer]    Zara Kaiser, TRAVIS, APRN, CNP 6/5/2025 6:36 PM  [facility] [place]    Beronica Nunez is a 34 year old female who presents to clinic today for the following health issues:  Chief Complaint   Patient presents with    UTI     Pt states that for the last week she has had urgency and cramping in the vagina. Denies any burning or itching.         6/5/2025     5:30 PM   Additional Questions   Roomed by Raphael   Accompanied by Self       UTI    Onset of symptoms was 1 week(s).  Course of illness is worsening  Current and associated symptoms urgency, burning after peeing started today, nausea yesterday and today, lower abdominal cramping. She had a temp of 100.4F 2 days ago which resolved and she had a cold as well.  Denies hematuria, emesis, flank pain, vaginal discharge, itching, odor  Treatment and measures tried none  Predisposing factors include none  Denies  history of frequent UTI's, history of Pyelonephritis, diabetes, and kidney stones  She takes ocp contiuously and has been  "having some brown spotting.   No history of abdominal surgery.  She drinks not much water  Does wear cotton underwear  No concern for STD or pregnancy   She drinks 3-4 cups caffeine day    Problem list, Medication list, Allergies, and Medical history reviewed in EPIC.    ROS:  Review of systems negative except for noted above        Objective    /74 (BP Location: Right arm, Patient Position: Sitting)   Pulse 78   Temp 98.4  F (36.9  C) (Tympanic)   Resp 18   Ht 1.803 m (5' 11\")   Wt 117.9 kg (260 lb)   SpO2 98%   BMI 36.26 kg/m    Physical Exam  Constitutional:       General: She is not in acute distress.     Appearance: She is not toxic-appearing or diaphoretic.   HENT:      Head: Normocephalic and atraumatic.      Nose: Congestion present.   Pulmonary:      Effort: No respiratory distress.   Abdominal:      General: Bowel sounds are normal. There is no distension.      Palpations: Abdomen is soft.      Tenderness: There is no abdominal tenderness. There is no right CVA tenderness, left CVA tenderness, guarding or rebound.   Lymphadenopathy:      Cervical: No cervical adenopathy.   Skin:     General: Skin is warm and dry.   Neurological:      Mental Status: She is alert.              Labs:  Results for orders placed or performed in visit on 06/05/25   UA Macroscopic with reflex to Microscopic and Culture - Lab Collect     Status: Abnormal    Specimen: Urine, Clean Catch   Result Value Ref Range    Color Urine Yellow Colorless, Straw, Light Yellow, Yellow    Appearance Urine Slightly Cloudy (A) Clear    Glucose Urine Negative Negative mg/dL    Bilirubin Urine Negative Negative    Ketones Urine Negative Negative mg/dL    Specific Gravity Urine >=1.030 1.003 - 1.035    Blood Urine Trace (A) Negative    pH Urine 6.0 5.0 - 7.0    Protein Albumin Urine Trace (A) Negative mg/dL    Urobilinogen Urine 0.2 0.2, 1.0 E.U./dL    Nitrite Urine Negative Negative    Leukocyte Esterase Urine Trace (A) Negative   UA " Microscopic with Reflex to Culture     Status: Abnormal   Result Value Ref Range    Bacteria Urine Moderate (A) None Seen /HPF    RBC Urine 0-2 0-2 /HPF /HPF    WBC Urine 0-5 0-5 /HPF /HPF    Squamous Epithelials Urine Many (A) None Seen /LPF    Narrative    Urine Culture not indicated   Wet prep - Clinic Collect     Status: Abnormal    Specimen: Vagina; Swab   Result Value Ref Range    Trichomonas Absent Absent    Yeast Absent Absent    Clue Cells Absent Absent    WBCs/high power field 2+ (A) None

## 2025-06-05 NOTE — PATIENT INSTRUCTIONS
Rest, fluids, increase water and decrease caffeine which is a bladder irritant  No sign of bladder infection or BV, trich, yeast infection at this time.   We will culture urine and notify you if an antibiotic is indicated if there is a bladder infection  Go to emergency room if you develop worsening abdominal pain, fever returns

## 2025-06-07 LAB — BACTERIA UR CULT: NORMAL
